# Patient Record
(demographics unavailable — no encounter records)

---

## 2024-10-10 NOTE — HISTORY OF PRESENT ILLNESS
[de-identified] : Body part: neck Better/ Worse/ Same since last visit: better Treatments since last visit: PT 2x a week at [facility] Difficulty with: lifting Radicular symptoms: down the right arm Paresthesia: intermittently Current medications for pain: Celebrex and Tylenol Arthritis Assistive walking device: none  Today's Pain: 6/10

## 2024-10-10 NOTE — ASSESSMENT
[FreeTextEntry1] : 7/2024 MRI of C-Spine was reviewed today and is as follows:  Large HNP C5-6 with severe stenosis  Large HNP C6-7 with severe stenosis   66 yo female presents today for eval of her neck pain. MRI shows severe stenosis and HNP. Patient has not had RADHA in her neck yet, some improvements with PT. I discussed with patient given persistent radicular symptoms, she is likely to benefit from RADHA for relief.   - Referral to pain management for cervical RADHA, follow up 2 weeks after injection.   - Recommend physical therapy to regain range of motion, strengthening and symptomatic improvement. Prescription given in office today.   - Patient given referral to acupuncture for evaluation and treatment.   - Recommend NSAIDs PRN - Recommend heating pad use to decrease muscle spasm - Discussed the importance of home exercises, including but not limited to neck stretching and cervical traction   Patient was educated on their diagnosis today. All questions answered and patient expressed understanding.  I had a long discussion with the patient about diagnosis, natural history of disease, treatment options and prognosis. Patient understands that the symptoms of gait instability and difficulty with fine motor manipulation is associated with cervical myelopathy. Patient is thoroughly counseled about stepwise progression of the disease and possible functional decline. The rational for surgery on cervical myelopathy is to halt further progression of the disease and resulting functional decline.   Follow up in 2 months

## 2024-10-14 NOTE — IMAGING
[Straightening consistent with spasm] : Straightening consistent with spasm [No spinal deformity, fracture, lytic lesion, or marked single level collapse] : No spinal deformity, fracture, lytic lesion, or marked single level collapse [Right] : right shoulder [There are no fractures, subluxations or dislocations. No significant abnormalities are seen] : There are no fractures, subluxations or dislocations. No significant abnormalities are seen [Type 2 acromion] : Type 2 acromion [AC Joint Arthrosis] : AC Joint Arthrosis [de-identified] : (Exam: Shoulder)   Laterality is right    Patient is in no acute distress, alert and oriented Sensation is grossly intact to light touch in the hand Motor function is 5/5 in the hand Capillary refill is less than 2 seconds in the fingers Lymphadenopathy is not present Peripheral edema is not present   Skin is intact Swelling is not present Atrophy is not present Scapular winging is not present Deformity of the AC joint is not present Deformity of the biceps is not present   Bicipital groove tenderness is present AC joint tenderness is present Scapulothoracic tenderness is not present   Active forward elevation is 140 Passive forward elevation is 160 External rotation at the side is 60 Internal rotation behind the back is to the level of T12   Forward elevation strength is 4/5 External rotation strength at the side is 4/5 Internal rotation strength at the side is 5/5 Deltoid strength of anterior, posterior and lateral heads is 5/5   Zepeda test is abnormal OBriens test is abnormal Empty can test is abnormal Speeds test is abnormal Cross body adduction test is abnormal Belly press test is normal Apprehension and relocation is normal Sulcus sign is normal

## 2024-10-14 NOTE — DISCUSSION/SUMMARY
[de-identified] : History, clinical examination and imaging were most consistent with: -right shoulder rotator cuff moderate grade partial tear, AC joint sprain   The diagnosis was explained in detail. The potential non-surgical and surgical treatments were reviewed. The relative risks and benefits of each option were considered relative to the patients age, activity level, medical history, symptom severity and previously attempted treatments.   The patient was advised to consult with their primary medical provider prior to initiation of any new medications to reduce the risk of adverse effects specific to their long-term home medications and medical history. The risk of gastrointestinal irritation and kidney injury specific to long-term NSAID use was discussed.   -Discussed overlap of shoulder and cervical pathology. Based on current symptoms and imaging findings, discussed that cervical remains worse at this time. -Recommend formal PT for the shoulder. -Cortisone injection is deferred. -Flexeril for muscle spasms and tightness, take as needed. -Cervical care as per Dr. Hilliard. -Follow up in 2 months, consider subacromial CSI if symptoms persist.      (MDM)   Problem Complexity -Moderate: acute, complicated injury  Risk -Moderate: prescription medication
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
## 2024-10-14 NOTE — DATA REVIEWED
[MRI] : MRI [Right] : of the right [Shoulder] : shoulder [I independently reviewed and interpreted images and report] : I independently reviewed and interpreted images and report [FreeTextEntry1] : moderate grade partial supraspinatus tear. SLAP tear

## 2024-10-24 NOTE — IMAGING
[Facet arthropathy] : Facet arthropathy [Disc space narrowing] : Disc space narrowing [Spondylolithesis] : Spondylolithesis [Spondylolysis] : Spondylolysis
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
## 2024-11-14 NOTE — PHYSICAL EXAM
[de-identified] : Constitutional:   - No acute distress   - Obesity  Neurological:   - normal mood and affect   - alert and oriented x 3     Cardiovascular:   - grossly normal   Cervical Spine Exam:     Inspection:  Erythema (-)  Ecchymosis (-)  Rashes (-)     Palpation:                                               Cervical paraspinal tenderness:         R (+); L (-)  Upper trapezius tenderness:              R (+); L (+)  Rhomboids tenderness:                      R (-); L (-)  Occipital Ridge:                                   R (-); L (-)  Supraspinatus tenderness:                 R (-); L (-)     ROM: reduced all planes pain throughout range of motion testing  Strength Testing:             Right    Left  Deltoid                             (5/5)    (5/5)  Biceps:                            (5/5)    (5/5)  Triceps:                           (5/5)    (5/5)  Finger Abductors:           (5/5)    (5/5)  Grasp:                             (5/5)    (5/5)     Special Testing:  Spurling Test:                  R (+); L (-)  Facet load test:               R (+); L (+)  Cornell test:                  R (-); L (-)     Neuro:  SILT throughout right upper extremity  SI LT throughout left upper extremity     Reflexes:  Biceps   -           R (2+); L (2+)  Triceps  -           R (2+); L (2+)  Brachioradialis- R (2+); L (2+)     No ankle clonus

## 2024-11-14 NOTE — PHYSICAL EXAM
[de-identified] : Constitutional:   - No acute distress   - Obesity  Neurological:   - normal mood and affect   - alert and oriented x 3     Cardiovascular:   - grossly normal   Cervical Spine Exam:     Inspection:  Erythema (-)  Ecchymosis (-)  Rashes (-)     Palpation:                                               Cervical paraspinal tenderness:         R (+); L (-)  Upper trapezius tenderness:              R (+); L (+)  Rhomboids tenderness:                      R (-); L (-)  Occipital Ridge:                                   R (-); L (-)  Supraspinatus tenderness:                 R (-); L (-)     ROM: reduced all planes pain throughout range of motion testing  Strength Testing:             Right    Left  Deltoid                             (5/5)    (5/5)  Biceps:                            (5/5)    (5/5)  Triceps:                           (5/5)    (5/5)  Finger Abductors:           (5/5)    (5/5)  Grasp:                             (5/5)    (5/5)     Special Testing:  Spurling Test:                  R (+); L (-)  Facet load test:               R (+); L (+)  Cornell test:                  R (-); L (-)     Neuro:  SILT throughout right upper extremity  SI LT throughout left upper extremity     Reflexes:  Biceps   -           R (2+); L (2+)  Triceps  -           R (2+); L (2+)  Brachioradialis- R (2+); L (2+)     No ankle clonus
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
## 2024-11-14 NOTE — DATA REVIEWED
[Lumbar Spine] : lumbar spine [MRI] : MRI [Cervical Spine] : cervical spine [Report was reviewed and noted in the chart] : The report was reviewed and noted in the chart [I reviewed the films/CD] : I reviewed the films/CD [I independently reviewed and interpreted images and report] : I independently reviewed and interpreted images and report

## 2024-11-14 NOTE — ASSESSMENT
[FreeTextEntry1] : A discussion regarding available pain management treatment options occurred with the patient.  These included interventional, rehabilitative, pharmacological, and alternative modalities. We will proceed with the following:    Interventional treatment options:   - Proceed with right C7-T1 JAY (40 mg Kenalog) with fluoroscopic guidance - Will consider h repeat right PM L5-S1 LESI (40 mg Kenalog) with return of severe radicular/claudication pain - Patient will hold aspirin x 7 days for indicated procedure (primary prophylaxis) - patient counseled regarding blood glucose control during the periprocedural time period - Can consider right GTB injection for ongoing focal lateral hip pain - see additional instructions below    Rehabilitative options:   - Completed physical therapy trial - participation in active HEP was discussed and encouraged as tolerated  Medication based treatment options:   - Discontinue meloxicam; start Celebrex 200 mg daily as needed - continue Tylenol 500-1000 mg up to TAD as needed - see additional instructions below    Complementary treatment options:   - Weight management and lifestyle modifications discussed     Additional treatment recommendations as follows:   - patient will follow-up with Dr. Hilliard regarding lumbar spine - Incidental finding of thyroid nodule previously addressed w/ U/S/biopsy- results negative. Continue FU with PCP - Patient was counseled as to red flag signs and when to seek urgent care - Follow up 1-2 weeks post injection for assessment of efficacy and further treatment recommendations  The risks, benefits and alternatives of the proposed procedure were explained in detail with the patient. The risks outlined include but are not limited to infection, bleeding, post- dural puncture headache, nerve injury, a temporary increase in pain, failure to resolve symptoms, need for future interventions, allergic reaction, and possible elevation of blood sugar in diabetics if using corticosteroid.  All questions were answered to patient's apparent satisfaction, and he/she verbalized an understanding.  We have discussed the risks, benefits, and alternatives NSAID therapy including but not limited to the risk of bleeding, thrombosis, gastric mucosal irritation/ulceration, allergic reaction and kidney dysfunction; the patient verbalizes an understanding.  I, Saira Rodriges NP, acting as scribe, attest that this documentation has been prepared under the direction and in the presence of Provider Mukesh Kilpatrick DO.    The documentation recorded by the scribe, in my presence, accurately reflects the service I personally performed, and the decisions made by me with my edits as appropriate.
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
## 2024-11-22 NOTE — DATA REVIEWED
[MRI] : MRI [Lumbar Spine] : lumbar spine [I independently reviewed and interpreted images and report] : I independently reviewed and interpreted images and report [FreeTextEntry1] : 10/2024 MRI of L-Spine was reviewed today and is as follows:  Moderate to severe stenosis L3-4 Severe stenosis L4-5 Spondylolisthesis L5-S1 with severe bilateral foraminal stenosis

## 2024-11-22 NOTE — HISTORY OF PRESENT ILLNESS
[de-identified] : Body part: lower back Better/ Worse/ Same since last visit: Same Treatments since last visit: lumbar spine MRI at [facility], PT Difficulty with: Sleeping, activity  Radicular symptoms:  into the right hip, denies numbness, does report intermittent tingling sensation.  Current medications for pain: Tylenol Arthritis, Tylenol PM Assistive walking device: Cane   Today's Pain: 6.5-7/10

## 2024-11-22 NOTE — ASSESSMENT
[FreeTextEntry1] : 10/2024 MRI of L-Spine was reviewed today and is as follows:  Moderate to severe stenosis L3-4 Severe stenosis L4-5 Spondylolisthesis L5-S1 with severe bilateral foraminal stenosis   68 yo female presents today for eval of her low back pain. MRI above with severe stenosis and spondylolisthesis. Patient had x2 RADHA with Dr. Kilpatrick, most recently in 9/2024 with short term relief.   - Referral to Dr. Kilpatrick for lumbar interlaminar RADHA  - Continue physical therapy to regain range of motion, strengthening and symptomatic improvement. Prescription given in office today.   - Likely to proceed with surgical management after New Year   - Recommend NSAIDs PRN - Recommend heating pad use to decrease muscle spasm - Discussed the importance of home exercises, including but not limited to hamstring stretching and core strengthening   Patient was educated on their diagnosis today. All questions answered and patient expressed understanding.  Follow up in 2 months

## 2024-12-11 NOTE — PROCEDURE
[FreeTextEntry3] : Date of Service: 12/11/2024   Account: [id number]  Patient: NATHANIEL LEVINE   YOB: 1957  Age: 67 years  Surgeon:      Mukesh Kilpatrick DO  Assistant:    None  Pre-Operative Diagnosis:         Cervical Radiculopathy (M54.12)  Post Operative Diagnosis:       Cervical Radiculopathy (M54.12)  Procedure:             Cervical (C7-T1) interlaminar epidural steroid injection under fluoroscopic guidance  Anesthesia:  MAC  This procedure was carried out using fluoroscopic guidance.  The risks and benefits of the procedure were discussed extensively with the patient.  The consent of the patient was obtained and the following procedure was performed.  A timeout was performed with all essential staff present and the site and side were verified.  The patient was placed in the prone position and optimized to patient comfort.  The cervical area was prepped and draped in a sterile fashion.  The fluoroscope visualized the C7-T1 interspace using slight cephalad-caudad angulation and this area was marked.  Using sterile technique, the superficial skin was anesthetized with 1% Lidocaine.  A 20-gauge 3.5-inch Tuohy needle was advanced under fluoroscopy until ligament was engaged.  Using a contralateral oblique view, a "loss of resistance" to air technique was utilized in order to gain access to the epidural space.  After negative aspiration for heme and CSF, 1 cc of Omnipaque contrast was administered and the appropriate cervical epidurogram was obtained in the ABRIL and A/P view as well as digital subtraction angiography.  A total injectate of 3 cc of preservative free normal saline and 40 mg of Kenalog was then injected into the epidural space while maintaining meaningful verbal contact with the patient.    Vital signs remained normal throughout the procedure.  The patient tolerated the procedure well.  There were no immediate complications from the performed procedure.  The patient was instructed to apply ice over the injection sites for twenty minutes every two hours for the next 24 hours.  Disposition:      1. The patient was advised to F/U in 1-2 weeks to assess the response to the injection.      2. The patient was also instructed to contact me immediately if there were any concerns related to the procedure performed.
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
## 2024-12-16 NOTE — IMAGING
[Straightening consistent with spasm] : Straightening consistent with spasm [No spinal deformity, fracture, lytic lesion, or marked single level collapse] : No spinal deformity, fracture, lytic lesion, or marked single level collapse [Right] : right shoulder [There are no fractures, subluxations or dislocations. No significant abnormalities are seen] : There are no fractures, subluxations or dislocations. No significant abnormalities are seen [Type 2 acromion] : Type 2 acromion [AC Joint Arthrosis] : AC Joint Arthrosis [de-identified] : (Exam: Shoulder)   Laterality is right    Patient is in no acute distress, alert and oriented Sensation is grossly intact to light touch in the hand Motor function is 5/5 in the hand Capillary refill is less than 2 seconds in the fingers Lymphadenopathy is not present Peripheral edema is not present   Skin is intact Swelling is not present Atrophy is not present Scapular winging is not present Deformity of the AC joint is not present Deformity of the biceps is not present   Bicipital groove tenderness is present AC joint tenderness is present Scapulothoracic tenderness is not present   Active forward elevation is 140 Passive forward elevation is 160 External rotation at the side is 60 Internal rotation behind the back is to the level of T12   Forward elevation strength is 4/5 External rotation strength at the side is 4/5 Internal rotation strength at the side is 5/5 Deltoid strength of anterior, posterior and lateral heads is 5/5   Zepeda test is abnormal OBriens test is abnormal Empty can test is abnormal Speeds test is abnormal Cross body adduction test is abnormal Belly press test is normal Apprehension and relocation is normal Sulcus sign is normal

## 2024-12-16 NOTE — DISCUSSION/SUMMARY
[de-identified] : History, clinical examination and imaging were most consistent with: -right shoulder rotator cuff moderate grade partial tear, AC joint sprain   The diagnosis was explained in detail. The potential non-surgical and surgical treatments were reviewed. The relative risks and benefits of each option were considered relative to the patients age, activity level, medical history, symptom severity and previously attempted treatments.   The patient was advised to consult with their primary medical provider prior to initiation of any new medications to reduce the risk of adverse effects specific to their long-term home medications and medical history. The risk of gastrointestinal irritation and kidney injury specific to long-term NSAID use was discussed.   -Discussed ongoing overlap of shoulder and cervical pathology. -Continue formal PT for the shoulder. -Cortisone injection is deferred due to recent diabetic exacerbation from cervical injection.  -Flexeril for muscle spasms and tightness, take as needed. -Cervical care as per Dr. Hilliard.  -Follow up in 2 months, consider subacromial CSI if symptoms persist.      (MDM)   Problem Complexity -Moderate: acute, complicated injury  Risk -Moderate: prescription medication

## 2025-01-02 NOTE — DATA REVIEWED
[Lumbar Spine] : lumbar spine [MRI] : MRI [Cervical Spine] : cervical spine [Report was reviewed and noted in the chart] : The report was reviewed and noted in the chart [I independently reviewed and interpreted images and report] : I independently reviewed and interpreted images and report [I reviewed the films/CD] : I reviewed the films/CD

## 2025-01-06 NOTE — PHYSICAL EXAM
[de-identified] : Constitutional:   - No acute distress   - Obesity  Neurological:   - normal mood and affect   - alert and oriented x 3     Cardiovascular:   - grossly normal   Lumbar Spine Exam:   Inspection:  erythema (-)  ecchymosis (-)  rashes (-)  alignment: no scoliosis   Palpation:  Midline lumbar tenderness:            (-)  midline thoracic tenderness:          (-)  Lumbar paraspinal tenderness:  L (+) ; R (+)  thoracic paraspinal tenderness: L (-) ; R (-)  sciatic nerve tenderness :          L (-) ; R (+)  SI joint tenderness:                     L (-) ; R (-)  GTB tenderness:                        L (-);  R (+)   ROM: WNL with exception of extension pain with extremes of extension  Strength:                                     Right       Left     Hip Flexion:                (5/5)       (5/5)  Quadriceps:               (5/5)       (5/5)  Hamstrings:                (5/5)       (5/5)  Ankle Dorsiflexion:     (5/5)       (5/5)  EHL:                           (5/5)       (5/5)  Ankle Plantarflexion:  (5/5)       (5/5)   Special Tests:  SLR:                            R (+) ; L (=)  Facet loading:             R (+) ; L (-)  ARTURO test:                R (n/a) ; L (n/a)  Hamstring tightness:   R (+);  L (+)   Neurologic:  SILT throughout right lower extremity  SILT throughout left lower extremity   Reflexes normal and symmetric bilateral lower extremities   Gait:  Mildly antalgic gait  ambulates without assistive device

## 2025-01-06 NOTE — ASSESSMENT
[FreeTextEntry1] : A discussion regarding available pain management treatment options occurred with the patient.  These included interventional, rehabilitative, pharmacological, and alternative modalities. We will proceed with the following:    Interventional treatment options:   - Proceed with repeat right PM L5-S1 LESI (40 mg Kenalog) with fluoroscopic guidance; patient may remain on aspirin for indicated procedure - Can consider repeat right C7-T1 JAY (40 mg Kenalog) with return of severe cervical radicular pain - patient once again counseled regarding blood glucose control during the periprocedural time period - Can consider right GTB injection for ongoing focal lateral hip pain - see additional instructions below    Rehabilitative options:   - Continue physical therapy as per orthopedics - participation in active HEP was discussed and encouraged as tolerated  Medication based treatment options:   - Continue Celebrex 200 mg daily as needed - continue Tylenol 500-1000 mg up to TID as needed - see additional instructions below    Complementary treatment options:   - Weight management and lifestyle modifications discussed     Additional treatment recommendations as follows:   - patient will follow-up with Dr. Hilliard regarding lumbar spine - Incidental finding of thyroid nodule previously addressed w/ U/S/biopsy- results negative; continue F/U with PCP as directed - Patient was counseled as to red flag signs and when to seek urgent care - Follow up 1-2 weeks post injection for assessment of efficacy and further treatment recommendations  The risks, benefits and alternatives of the proposed procedure were explained in detail with the patient. The risks outlined include but are not limited to infection, bleeding, post- dural puncture headache, nerve injury, a temporary increase in pain, failure to resolve symptoms, need for future interventions, allergic reaction, and possible elevation of blood sugar in diabetics if using corticosteroid.  All questions were answered to patient's apparent satisfaction, and he/she verbalized an understanding.  We have discussed the risks, benefits, and alternatives NSAID therapy including but not limited to the risk of bleeding, thrombosis, gastric mucosal irritation/ulceration, allergic reaction and kidney dysfunction; the patient verbalizes an understanding.  I, Saira Rodriges NP, acting as scribe, attest that this documentation has been prepared under the direction and in the presence of Provider Mukesh Kilpatrick DO.    The documentation recorded by the scribe, in my presence, accurately reflects the service I personally performed, and the decisions made by me with my edits as appropriate.
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
## 2025-01-06 NOTE — PHYSICAL EXAM
[de-identified] : Constitutional:   - No acute distress   - Obesity  Neurological:   - normal mood and affect   - alert and oriented x 3     Cardiovascular:   - grossly normal   Lumbar Spine Exam:   Inspection:  erythema (-)  ecchymosis (-)  rashes (-)  alignment: no scoliosis   Palpation:  Midline lumbar tenderness:            (-)  midline thoracic tenderness:          (-)  Lumbar paraspinal tenderness:  L (+) ; R (+)  thoracic paraspinal tenderness: L (-) ; R (-)  sciatic nerve tenderness :          L (-) ; R (+)  SI joint tenderness:                     L (-) ; R (-)  GTB tenderness:                        L (-);  R (+)   ROM: WNL with exception of extension pain with extremes of extension  Strength:                                     Right       Left     Hip Flexion:                (5/5)       (5/5)  Quadriceps:               (5/5)       (5/5)  Hamstrings:                (5/5)       (5/5)  Ankle Dorsiflexion:     (5/5)       (5/5)  EHL:                           (5/5)       (5/5)  Ankle Plantarflexion:  (5/5)       (5/5)   Special Tests:  SLR:                            R (+) ; L (=)  Facet loading:             R (+) ; L (-)  ARTURO test:                R (n/a) ; L (n/a)  Hamstring tightness:   R (+);  L (+)   Neurologic:  SILT throughout right lower extremity  SILT throughout left lower extremity   Reflexes normal and symmetric bilateral lower extremities   Gait:  Mildly antalgic gait  ambulates without assistive device
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
## 2025-01-09 NOTE — HISTORY OF PRESENT ILLNESS
[de-identified] : Body part: neck Better/ Worse/ Same since last visit: better Treatments since last visit: Had RADHA with Dr. Kilpatrick ~2 weeks ago with some relief, PT 2x a week, Denies going to Acupuncture Difficulty with: reaching and lifting Radicular symptoms: intermittently down the right arm, has numbness when reaching upwards Current medications for pain: Celebrex and Tylenol Arthritis Assistive walking device: none  Today's Pain: 3/10

## 2025-01-09 NOTE — ASSESSMENT
[FreeTextEntry1] : 7/2024 MRI of C-Spine was reviewed today and is as follows:  Large HNP C5-6 with severe stenosis  Large HNP C6-7 with severe stenosis   68 yo female presents today for eval of her neck pain. MRI shows severe stenosis and HNP. Patient had RADHA with Dr. Kilpatrick on 12/11 with 75% relief in symptoms. Patient states her pain is more intermittent and only worse with certain activities. Recommend continuing PT.   - Continue physical therapy to regain range of motion, strengthening and symptomatic improvement. Prescription given in office today.   - Refill of gabapentin given today   - Recommend NSAIDs PRN - Recommend heating pad use to decrease muscle spasm - Discussed the importance of home exercises, including but not limited to neck stretching and cervical traction   Patient was educated on their diagnosis today. All questions answered and patient expressed understanding.  I had a long discussion with the patient about diagnosis, natural history of disease, treatment options and prognosis. Patient understands that the symptoms of gait instability and difficulty with fine motor manipulation is associated with cervical myelopathy. Patient is thoroughly counseled about stepwise progression of the disease and possible functional decline. The rational for surgery on cervical myelopathy is to halt further progression of the disease and resulting functional decline.   Follow up in 2 months

## 2025-01-24 NOTE — HISTORY OF PRESENT ILLNESS
[de-identified] : Body part: Lower Back  Better/ Worse/ Same since last visit: Worse  Treatments since last visit: Physical Therapy 2x/week at [facility] with little relief, pt states she is scheduled for injection 1/31 Difficulty with: Sleeping, activity  Radicular symptoms:  Pain down right leg into the toes  Current medications for pain: Tylenol Arthritis, Tylenol PM Assistive walking device: Cane   Today's Pain: 10 /10

## 2025-01-24 NOTE — ASSESSMENT
[FreeTextEntry1] : 10/2024 MRI of L-Spine was reviewed today and is as follows:  Moderate stenosis L2-3 Moderate to severe stenosis L3-4 Severe stenosis L4-5 with spondylolisthesis  Spondylolisthesis L5-S1 with severe bilateral foraminal stenosis   66 yo female presents today for eval of her low back pain. MRI above with severe stenosis and spondylolisthesis. Patient had x2 RADHA with Dr. Kilpatrick, most recently in 9/2024 with short term relief. Patient with significant bursitis pain. However, with RADHA coming up this week and history of DM, CSI not recommended today.   - Proceed with RADHA with Dr. Kilpatrick on 1/31 as scheduled  - Prescription given for Voltaren gel today. Advised patient to apply to the area of pain as needed.   - May consider surgical management at some point in the future. Discussed with patient that surgical management should be last resort as she will most likely require fusion surgery with long recovery and significant anesthesia risk  - Recommend NSAIDs PRN - Recommend heating pad use to decrease muscle spasm - Discussed the importance of home exercises, including but not limited to hamstring stretching and core strengthening   Patient was educated on their diagnosis today. All questions answered and patient expressed understanding.  Follow up in 2 months

## 2025-02-21 NOTE — IMAGING
[Straightening consistent with spasm] : Straightening consistent with spasm [No spinal deformity, fracture, lytic lesion, or marked single level collapse] : No spinal deformity, fracture, lytic lesion, or marked single level collapse [Right] : right shoulder [There are no fractures, subluxations or dislocations. No significant abnormalities are seen] : There are no fractures, subluxations or dislocations. No significant abnormalities are seen [Type 2 acromion] : Type 2 acromion [AC Joint Arthrosis] : AC Joint Arthrosis [de-identified] : (Exam: Shoulder)   Laterality is right    Patient is in no acute distress, alert and oriented Sensation is grossly intact to light touch in the hand Motor function is 5/5 in the hand Capillary refill is less than 2 seconds in the fingers Lymphadenopathy is not present Peripheral edema is not present   Skin is intact Swelling is not present Atrophy is not present Scapular winging is not present Deformity of the AC joint is not present Deformity of the biceps is not present   Bicipital groove tenderness is present AC joint tenderness is present Scapulothoracic tenderness is not present   Active forward elevation is 140 Passive forward elevation is 160 External rotation at the side is 60 Internal rotation behind the back is to the level of T12   Forward elevation strength is 4/5 External rotation strength at the side is 4/5 Internal rotation strength at the side is 5/5 Deltoid strength of anterior, posterior and lateral heads is 5/5   Zepeda test is abnormal OBriens test is abnormal Empty can test is abnormal Speeds test is abnormal Cross body adduction test is abnormal Belly press test is normal Apprehension and relocation is normal Sulcus sign is normal
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
## 2025-02-21 NOTE — DISCUSSION/SUMMARY
[de-identified] : History, clinical examination and imaging were most consistent with: -right shoulder rotator cuff moderate grade partial tear, AC joint sprain   The diagnosis was explained in detail. The potential non-surgical and surgical treatments were reviewed. The relative risks and benefits of each option were considered relative to the patients age, activity level, medical history, symptom severity and previously attempted treatments.   The patient was advised to consult with their primary medical provider prior to initiation of any new medications to reduce the risk of adverse effects specific to their long-term home medications and medical history. The risk of gastrointestinal irritation and kidney injury specific to long-term NSAID use was discussed.   -Discussed ongoing overlap of shoulder and cervical pathology. Continue cervical care as per Dr. Hilliard.  -Continue formal PT for the shoulder. -Cortisone injection performed on right shoulder for symptom relief. Advised patient to monitor blood sugars and contact PMD with any abnormalities.  -Flexeril for muscle spasms and tightness, take as needed. -Follow up in 3 months, consider surgical options if symptoms persist.      (MDM)   Problem Complexity -Moderate: acute, complicated injury  Risk -Moderate: injection  (Procedure: Corticosteroid Injection)   Injection location was the: -right subacromial bursa   Injection contents were: 40 mg of kenalog and 5 mL of 1% lidocaine   Procedure Details: -Informed consent was obtained. Discussed possible risks of corticosteroid injection including hyperglycemia, infection and skin discoloration. -Discussed that due to infection risk, an interval between injection and any future surgery is 6 weeks for arthroscopy and 3 months for arthroplasty. -Injection performed using aseptic technique. Hemostasis was confirmed. -Procedure was tolerated well with no complications.

## 2025-03-28 NOTE — HISTORY OF PRESENT ILLNESS
[de-identified] : Body part: lower back  Better/ Worse/ Same since last visit: same Treatments since last visit: Denies having RADHA on 1/31/25 due to blood sugar being too high, Denies going to PT due to health issues Difficulty with: rising from sitting to standing, walking Radicular symptoms:  down right leg to the toes, has intermittent tingling Current medications for pain: Gabapentin, Celebrex, and Tylenol Arthritis Assistive walking device: cane   Today's Pain: 7/10

## 2025-03-28 NOTE — ASSESSMENT
[FreeTextEntry1] : 10/2024 MRI of L-Spine was reviewed today and is as follows:  Moderate stenosis L2-3 Moderate to severe stenosis L3-4 Severe stenosis L4-5 with spondylolisthesis  Spondylolisthesis L5-S1 with severe bilateral foraminal stenosis   68 yo female presents today for eval of her low back pain. MRI above with severe stenosis and spondylolisthesis. She has had other health issues, including COPD and hyperglycemia, which have inhibited her ability to do PT and RADHA. Given hyperglycemia do not recommend further RADHA. She may benefit from PT from relief.   - Recommend physical therapy to regain range of motion, strengthening and symptomatic improvement. Prescription given in office today.   - May consider surgical management at some point in the future. Discussed with patient that surgical management should be last resort as she will most likely require fusion surgery with long recovery and significant anesthesia risk  - Recommend NSAIDs PRN - Recommend heating pad use to decrease muscle spasm - Discussed the importance of home exercises, including but not limited to hamstring stretching and core strengthening   Patient was educated on their diagnosis today. All questions answered and patient expressed understanding.  Follow up in 2 months  Tranexamic Acid Pregnancy And Lactation Text: It is unknown if this medication is safe during pregnancy or breast feeding.

## 2025-04-11 NOTE — HISTORY OF PRESENT ILLNESS
[de-identified] : Body part: neck Better/ Worse/ Same since last visit: better Treatments since last visit: PT and chiropractor 2x a week with relief, HEP Difficulty with: reaching and lifting Radicular symptoms: intermittently down the right arm, has tingling when reaching upwards Current medications for pain: Celebrex and Tylenol Arthritis Assistive walking device: none  Today's Pain: 3/10

## 2025-04-11 NOTE — ASSESSMENT
[FreeTextEntry1] : 7/2024 MRI of C-Spine was reviewed today and is as follows:  Large HNP C5-6 with severe stenosis  Large HNP C6-7 with severe stenosis   68 yo female presents today for eval of her neck pain. MRI shows severe stenosis and HNP. Patient had RADHA with Dr. Kilpatrick on 12/11 with 75% relief in symptoms. She is not a candidate for further RADHA given glycemic control issues, on insulin. We discussed her best option for management is continuing PT and chiropractic care at this time.   - Continue physical therapy to regain range of motion, strengthening and symptomatic improvement. Prescription given in office today.   - Recommend NSAIDs PRN - Recommend heating pad use to decrease muscle spasm - Discussed the importance of home exercises, including but not limited to neck stretching and cervical traction   Patient was educated on their diagnosis today. All questions answered and patient expressed understanding.  I had a long discussion with the patient about diagnosis, natural history of disease, treatment options and prognosis. Patient understands that the symptoms of gait instability and difficulty with fine motor manipulation is associated with cervical myelopathy. Patient is thoroughly counseled about stepwise progression of the disease and possible functional decline. The rational for surgery on cervical myelopathy is to halt further progression of the disease and resulting functional decline.   Follow up in 2 months

## 2025-04-15 NOTE — PHYSICAL EXAM
[NL (0)] : extension 0 degrees [5___] : hamstring 5[unfilled]/5 [Left] : left knee [AP] : anteroposterior [Lateral] : lateral [[place]] : skyline [] : mildly antalgic [TWNoteComboBox7] : flexion 115 degrees
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
## 2025-04-15 NOTE — DISCUSSION/SUMMARY
[de-identified] : Discussed importance of non-impact exercise and muscle stretching before and after exercise. Reviewed x-rays Explained the importance of ice and rest.  Stretching exercises shown, Explained the importance of Range of Motion before strength. Patient is ambulating with cane for the Back concerns Continue consultation with Dr. Witt regarding LBP.  Patient can continue to take Meloxicam and tylenol as needed.  Continue home strengthening and stretching program consisting of non-impact exercises and ice as needed. Return to office 8 months for the left knee
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
## 2025-05-16 NOTE — IMAGING
[Straightening consistent with spasm] : Straightening consistent with spasm [No spinal deformity, fracture, lytic lesion, or marked single level collapse] : No spinal deformity, fracture, lytic lesion, or marked single level collapse [Right] : right shoulder [There are no fractures, subluxations or dislocations. No significant abnormalities are seen] : There are no fractures, subluxations or dislocations. No significant abnormalities are seen [Type 2 acromion] : Type 2 acromion [AC Joint Arthrosis] : AC Joint Arthrosis [de-identified] : (Exam: Shoulder)   Laterality is right    Patient is in no acute distress, alert and oriented Sensation is grossly intact to light touch in the hand Motor function is 5/5 in the hand Capillary refill is less than 2 seconds in the fingers Lymphadenopathy is not present Peripheral edema is not present   Skin is intact Swelling is not present Atrophy is not present Scapular winging is not present Deformity of the AC joint is not present Deformity of the biceps is not present   Bicipital groove tenderness is present AC joint tenderness is present Scapulothoracic tenderness is not present   Active forward elevation is 140 Passive forward elevation is 160 External rotation at the side is 60 Internal rotation behind the back is to the level of T12   Forward elevation strength is 4/5 External rotation strength at the side is 4/5 Internal rotation strength at the side is 5/5 Deltoid strength of anterior, posterior and lateral heads is 5/5   Zepeda test is abnormal OBriens test is abnormal Empty can test is abnormal Speeds test is abnormal Cross body adduction test is abnormal Belly press test is normal Apprehension and relocation is normal Sulcus sign is normal

## 2025-05-16 NOTE — DISCUSSION/SUMMARY
[de-identified] : History, clinical examination and imaging were most consistent with: -right shoulder rotator cuff moderate grade partial tear, AC joint sprain   The diagnosis was explained in detail. The potential non-surgical and surgical treatments were reviewed. The relative risks and benefits of each option were considered relative to the patients age, activity level, medical history, symptom severity and previously attempted treatments.   The patient was advised to consult with their primary medical provider prior to initiation of any new medications to reduce the risk of adverse effects specific to their long-term home medications and medical history. The risk of gastrointestinal irritation and kidney injury specific to long-term NSAID use was discussed.   -Discussed ongoing overlap of shoulder and cervical pathology. Continue cervical care as per Dr. Hilliard.  -Continue formal PT for the shoulder. -Surgical options deferred at this time.  -Repeat injection deferred. -Tylenol as needed for pain.  -Flexeril for muscle spasms and tightness, take as needed. -Follow up in 3 months, revisit surgical options if symptoms persist.      (MDM)   Problem Complexity -Moderate: acute, complicated injury  Risk -Moderate: prescription medication

## 2025-06-18 NOTE — ASSESSMENT
[FreeTextEntry1] : 10/2024 MRI of L-Spine was reviewed today and is as follows:  Moderate stenosis L2-3 Moderate to severe stenosis L3-4 Severe stenosis L4-5 with spondylolisthesis  Spondylolisthesis L5-S1 with severe bilateral foraminal stenosis   69 yo female presents today for eval of her low back pain.  She has been unable to proceed with RADHA secondary to hyperglycemia in past but states that her glucose is under better control with medication. Though she may get some relief with chiropractic and PT, an RADHA is indicated for greater symptomatic relief.   - Referral to pain management Dr. Kilpatrick for LESI  - Consider chiropractic care  - May consider surgical management at some point in the future. Discussed with patient that surgical management should be last resort as she will most likely require fusion surgery with long recovery and significant anesthesia risk given comorbidities  - Recommend NSAIDs PRN - Recommend heating pad use to decrease muscle spasm - Discussed the importance of home exercises, including but not limited to hamstring stretching and core strengthening   Patient was educated on their diagnosis today. All questions answered and patient expressed understanding.  Follow up PRN

## 2025-06-18 NOTE — HISTORY OF PRESENT ILLNESS
[de-identified] : Body part: lower back  Better/ Worse/ Same since last visit: worse  Treatments since last visit: PT a few times at the begining of this year, none recently HEP Radicular symptoms:  down right leg to the toes, has intermittent tingling Current medications for pain: Gabapentin, Celebrex, and Tylenol Arthritis Assistive walking device: cane   Today's Pain: 6/10

## 2025-06-18 NOTE — ASSESSMENT
[FreeTextEntry1] : 10/2024 MRI of L-Spine was reviewed today and is as follows:  Moderate stenosis L2-3 Moderate to severe stenosis L3-4 Severe stenosis L4-5 with spondylolisthesis  Spondylolisthesis L5-S1 with severe bilateral foraminal stenosis   67 yo female presents today for eval of her low back pain.  She has been unable to proceed with RADHA secondary to hyperglycemia in past but states that her glucose is under better control with medication. Though she may get some relief with chiropractic and PT, an RADHA is indicated for greater symptomatic relief.   - Referral to pain management Dr. Kilpatrick for LESI  - Consider chiropractic care  - May consider surgical management at some point in the future. Discussed with patient that surgical management should be last resort as she will most likely require fusion surgery with long recovery and significant anesthesia risk given comorbidities  - Recommend NSAIDs PRN - Recommend heating pad use to decrease muscle spasm - Discussed the importance of home exercises, including but not limited to hamstring stretching and core strengthening   Patient was educated on their diagnosis today. All questions answered and patient expressed understanding.  Follow up PRN

## 2025-06-18 NOTE — HISTORY OF PRESENT ILLNESS
[de-identified] : Body part: lower back  Better/ Worse/ Same since last visit: worse  Treatments since last visit: PT a few times at the begining of this year, none recently HEP Radicular symptoms:  down right leg to the toes, has intermittent tingling Current medications for pain: Gabapentin, Celebrex, and Tylenol Arthritis Assistive walking device: cane   Today's Pain: 6/10